# Patient Record
Sex: FEMALE | Race: OTHER | NOT HISPANIC OR LATINO
[De-identification: names, ages, dates, MRNs, and addresses within clinical notes are randomized per-mention and may not be internally consistent; named-entity substitution may affect disease eponyms.]

---

## 2020-07-31 PROBLEM — Z00.00 ENCOUNTER FOR PREVENTIVE HEALTH EXAMINATION: Status: ACTIVE | Noted: 2020-07-31

## 2020-08-04 ENCOUNTER — APPOINTMENT (OUTPATIENT)
Dept: SURGERY | Facility: CLINIC | Age: 45
End: 2020-08-04
Payer: COMMERCIAL

## 2020-08-04 VITALS
HEART RATE: 79 BPM | HEIGHT: 65 IN | BODY MASS INDEX: 18.33 KG/M2 | DIASTOLIC BLOOD PRESSURE: 70 MMHG | WEIGHT: 110 LBS | OXYGEN SATURATION: 100 % | SYSTOLIC BLOOD PRESSURE: 105 MMHG | TEMPERATURE: 97.4 F

## 2020-08-04 PROCEDURE — 99203 OFFICE O/P NEW LOW 30 MIN: CPT

## 2020-08-04 NOTE — HISTORY OF PRESENT ILLNESS
[de-identified] : 46 y/o F with history of anemia, who presents for evaluation of epigastric hernia. Denies surgical history.  \par

## 2020-08-04 NOTE — PHYSICAL EXAM
[Normal] : affect appropriate [de-identified] : epigastric hernia extending to the right  tender to palpation

## 2020-08-04 NOTE — ADDENDUM
[FreeTextEntry1] : I, Martha Mcdaniel, acted solely as a scribe for Dr. Michele Tristan on 08/04/2020.

## 2020-08-04 NOTE — END OF VISIT
[FreeTextEntry3] : All medical record entries made by the Scribe were at my, Dr. Michele Tristan, direction and personally dictated by me on 08/04/2020. I have reviewed the chart and agree that the record accurately reflects my personal performance of the history, physical exam, assessment and plan. I have also personally directed, reviewed, and agreed with the chart.

## 2022-06-13 ENCOUNTER — APPOINTMENT (OUTPATIENT)
Dept: SURGERY | Facility: CLINIC | Age: 47
End: 2022-06-13
Payer: COMMERCIAL

## 2022-06-13 VITALS
HEART RATE: 68 BPM | WEIGHT: 103 LBS | BODY MASS INDEX: 17.16 KG/M2 | TEMPERATURE: 97.1 F | OXYGEN SATURATION: 99 % | SYSTOLIC BLOOD PRESSURE: 114 MMHG | HEIGHT: 65 IN | DIASTOLIC BLOOD PRESSURE: 83 MMHG

## 2022-06-13 PROCEDURE — 99243 OFF/OP CNSLTJ NEW/EST LOW 30: CPT

## 2022-06-13 RX ORDER — IBUPROFEN 600 MG/1
600 TABLET, FILM COATED ORAL
Qty: 8 | Refills: 0 | Status: ACTIVE | COMMUNITY
Start: 2021-12-13

## 2022-06-13 RX ORDER — FAMOTIDINE 20 MG/1
20 TABLET, FILM COATED ORAL
Qty: 20 | Refills: 0 | Status: ACTIVE | COMMUNITY
Start: 2021-12-13

## 2022-06-13 RX ORDER — DIAZEPAM 5 MG/1
5 TABLET ORAL
Qty: 2 | Refills: 0 | Status: ACTIVE | COMMUNITY
Start: 2022-02-17

## 2022-06-13 RX ORDER — IRON/IRON ASP GLY/FA/MV-MIN 38 125-25-1MG
TABLET ORAL
Refills: 0 | Status: ACTIVE | COMMUNITY

## 2022-06-13 RX ORDER — LORAZEPAM 0.5 MG/1
0.5 TABLET ORAL
Qty: 30 | Refills: 0 | Status: ACTIVE | COMMUNITY
Start: 2022-01-24

## 2022-06-13 NOTE — HISTORY OF PRESENT ILLNESS
[de-identified] : Ms. Estevez was previously seen by Dr. Tristan in 2020. [de-identified] : She presented today for evaluation and management of a periumbilical hernia.  She stated the hernia has been present for at least 2 years.  She first noticed the hernia as a bulge.  She noted pain of the area, especially with bending or having a bowel movement.  She stated the pain radiates across the abdomen from the right of the umbilicus toward the left abdomen.  She stated the symptoms are worsening since her prior visit with Dr. Tristan.

## 2022-06-13 NOTE — CONSULT LETTER
[FreeTextEntry1] : 2022\par \par \par \par Rayshawn García M.D.\par [facility]\par [address]\[place]  [address]\par Telephone #: [phone]\par \par \par Re: Junie Estevez\par : 1975\par \par \par Dear Dr. García:\par \par I had the opportunity to see Ms. Estevez today for evaluation and management of a periumbilical hernia.  She stated the hernia has been present for at least 2 years.  She first noticed the hernia as a bulge.  She noted pain of the area, especially with bending or having a bowel movement.  She stated the pain radiates across the abdomen from the right of the umbilicus toward the left abdomen.  She stated the symptoms are worsening since her prior visit with Dr. Tristan.\par \par On physical examination, her height is 5 feet 5 inches, her weight is 103 pounds, and BMI 17.14. Her temperature is 97.1 °F, blood pressure is 114/83, heart rate is 68, and O2 saturation is 99% on room air.  In general, she is a well-dressed, well-nourished woman who appears her stated age and is in no acute distress.  She is calm, alert and oriented x 3.  HEENT exam demonstrates no scleral icterus and a normocephalic atraumatic appearance.  Her abdomen has audible bowel sounds, is soft, non-tender, and non-distended.  There is a small umbilical hernia that is reducible and non-tender.  There is a small rectus diastasis above the umbilicus.  Her extremities are warm and dry without clubbing, cyanosis or edema.  \par \par In summary, Ms. Estevez is a 47-year-old woman with a ventral hernia.  We will plan for an open ventral hernia repair with mesh on 2022.\par \par Thank you for the opportunity to care for this patient.  Please do not hesitate to contact me in the event that you have any questions or concerns regarding the care of this patient. \par \par Sincerely,\par \par \par \par \par Sayra Spann M.D.

## 2022-06-13 NOTE — PHYSICAL EXAM
[Calm] : calm [de-identified] : NAD, comfortable [de-identified] : NCAT, no scleral icterus [de-identified] : +BS soft NT ND.  No hepatosplenomegaly.  Small umbilical hernia, reducible and non-tender.  Small rectus diastasis above the umbilicus. [de-identified] : No clubbing, cyanosis, or edema. [de-identified] : Warm, dry. [de-identified] : A&Ox3

## 2022-06-13 NOTE — ASSESSMENT
[FreeTextEntry1] : Ms. Estevez is a 47-year-old woman with a ventral hernia.  We will plan for an open ventral hernia repair with mesh on July 20, 2022.

## 2022-07-18 ENCOUNTER — LABORATORY RESULT (OUTPATIENT)
Age: 47
End: 2022-07-18

## 2022-07-20 ENCOUNTER — TRANSCRIPTION ENCOUNTER (OUTPATIENT)
Age: 47
End: 2022-07-20

## 2022-07-20 NOTE — ASU PATIENT PROFILE, ADULT - NS PREOP UNDERSTANDS INFO
As per MD nothing to eat or drink after midnight. Patient instructed to come with photo ID, vaccination and insurance card; must dress comfortable; no jewelries, no smoking, alcohol drinking, or illicit drug use tonight; escort must show proof of vaccination and photo ID. Address and call back number provided./yes

## 2022-07-20 NOTE — ASU PATIENT PROFILE, ADULT - FALL HARM RISK - UNIVERSAL INTERVENTIONS
Bed in lowest position, wheels locked, appropriate side rails in place/Call bell, personal items and telephone in reach/Instruct patient to call for assistance before getting out of bed or chair/Non-slip footwear when patient is out of bed/[place] to call system/Physically safe environment - no spills, clutter or unnecessary equipment/Purposeful Proactive Rounding/Room/bathroom lighting operational, light cord in reach

## 2022-07-20 NOTE — ASU PATIENT PROFILE, ADULT - NSICDXPASTMEDICALHX_GEN_ALL_CORE_FT
PAST MEDICAL HISTORY:  Acid reflux     Migraines     Palpitations     S/P hysterectomy      PAST MEDICAL HISTORY:  Acid reflux     Migraines     Palpitations

## 2022-07-21 ENCOUNTER — TRANSCRIPTION ENCOUNTER (OUTPATIENT)
Age: 47
End: 2022-07-21

## 2022-07-21 ENCOUNTER — APPOINTMENT (OUTPATIENT)
Dept: SURGERY | Facility: AMBULATORY SURGERY CENTER | Age: 47
End: 2022-07-21

## 2022-07-21 ENCOUNTER — OUTPATIENT (OUTPATIENT)
Dept: OUTPATIENT SERVICES | Facility: HOSPITAL | Age: 47
LOS: 1 days | Discharge: HOME | End: 2022-07-21

## 2022-07-21 VITALS
HEIGHT: 65 IN | HEART RATE: 69 BPM | TEMPERATURE: 98 F | OXYGEN SATURATION: 99 % | RESPIRATION RATE: 16 BRPM | WEIGHT: 102.96 LBS

## 2022-07-21 VITALS — SYSTOLIC BLOOD PRESSURE: 117 MMHG | HEART RATE: 84 BPM | OXYGEN SATURATION: 97 % | DIASTOLIC BLOOD PRESSURE: 79 MMHG

## 2022-07-21 DIAGNOSIS — Z90.710 ACQUIRED ABSENCE OF BOTH CERVIX AND UTERUS: Chronic | ICD-10-CM

## 2022-07-21 PROCEDURE — 49560: CPT

## 2022-07-21 RX ORDER — APREPITANT 80 MG/1
40 CAPSULE ORAL ONCE
Refills: 0 | Status: COMPLETED | OUTPATIENT
Start: 2022-07-21 | End: 2022-07-21

## 2022-07-21 RX ORDER — ONDANSETRON 8 MG/1
4 TABLET, FILM COATED ORAL ONCE
Refills: 0 | Status: DISCONTINUED | OUTPATIENT
Start: 2022-07-21 | End: 2022-07-21

## 2022-07-21 RX ORDER — FENTANYL CITRATE 50 UG/ML
25 INJECTION INTRAVENOUS
Refills: 0 | Status: DISCONTINUED | OUTPATIENT
Start: 2022-07-21 | End: 2022-07-21

## 2022-07-21 RX ORDER — OXYCODONE HYDROCHLORIDE 5 MG/1
1 TABLET ORAL
Qty: 3 | Refills: 0
Start: 2022-07-21

## 2022-07-21 RX ORDER — DOCUSATE SODIUM 100 MG
1 CAPSULE ORAL
Qty: 30 | Refills: 0
Start: 2022-07-21

## 2022-07-21 RX ORDER — BENZOYL PEROXIDE MICRONIZED 5.8 %
1 TOWELETTE (EA) TOPICAL
Qty: 0 | Refills: 0 | DISCHARGE

## 2022-07-21 RX ORDER — SODIUM CHLORIDE 9 MG/ML
1000 INJECTION, SOLUTION INTRAVENOUS
Refills: 0 | Status: DISCONTINUED | OUTPATIENT
Start: 2022-07-21 | End: 2022-07-21

## 2022-07-21 RX ORDER — ACETAMINOPHEN 500 MG
1000 TABLET ORAL ONCE
Refills: 0 | Status: COMPLETED | OUTPATIENT
Start: 2022-07-21 | End: 2022-07-21

## 2022-07-21 RX ADMIN — APREPITANT 40 MILLIGRAM(S): 80 CAPSULE ORAL at 09:01

## 2022-07-21 RX ADMIN — SODIUM CHLORIDE 100 MILLILITER(S): 9 INJECTION, SOLUTION INTRAVENOUS at 12:35

## 2022-07-21 RX ADMIN — Medication 1000 MILLIGRAM(S): at 09:01

## 2022-07-21 NOTE — BRIEF OPERATIVE NOTE - OPERATION/FINDINGS
Midline incision 2 cm above umbilicus taken down to fascia where a ~ 0.4 cm defect was seen with no herniated contents. The fascia was repair primarily in a simple interrupted fashion. Skin closure was uneventful.

## 2022-07-21 NOTE — ASU DISCHARGE PLAN (ADULT/PEDIATRIC) - NS MD DC FALL RISK RISK
For information on Fall & Injury Prevention, visit: [email or web address] OR  [email or web address] OR  https://www.cdc.gov/steadi/patient.html

## 2022-07-21 NOTE — ASU DISCHARGE PLAN (ADULT/PEDIATRIC) - CARE PROVIDER_API CALL
Sayra Spann)  Surgery  [address]  [place], [place] [address]  Phone: [phone]  Fax: [phone]  Follow Up Time:

## 2022-07-21 NOTE — ASU DISCHARGE PLAN (ADULT/PEDIATRIC) - ASU DC SPECIAL INSTRUCTIONSFT
- You had the following surgery with Dr. Spann: ventral hernia repair.  -  Take acetaminophen (325 mg x 2 tablets) and ibuprofen (200 mg x 1 tablet) every 4 hours for pain on alternating basis such that you take acetaminophen then ibuprofen 2 hours later then acetaminophen 2 hours later. The medications are effective pain control agents that act differently and so do not increase the adverse effects of the other. Please do not take more than 4000 mg of acetaminophen daily as it can be hazardous to the liver in extreme doses. Take ibuprofen as recommended as in large doses over time it can cause peptic ulcers and other problems.   - Take oxycodone (5 mg tablet) only as needed for severe pain. This is a strong opiate pain medication and can be addicting, may make you drowsy, and will slow your bowels. Please take docusate capsules (every 8 hours) while taking oxycodone for stool softening effect.   - Please avoid submerging the incisions which are covered with skin glue (Dermabond). You may shower but do not scrub at the incisions.   - You may remove the dressing in 2 days, and you do not need to replace it unless there is drainage.   - Please call your surgeon's office for high fevers, worsening pain, redness/swelling or thick discharge from the incisions.   - Please avoid heavy lifting until evaluated by your surgeon.   - Follow-up with the surgeon in 1-2 weeks.

## 2022-07-22 ENCOUNTER — NON-APPOINTMENT (OUTPATIENT)
Age: 47
End: 2022-07-22

## 2022-07-22 PROBLEM — R00.2 PALPITATIONS: Chronic | Status: ACTIVE | Noted: 2022-07-20

## 2022-07-22 PROBLEM — K21.9 GASTRO-ESOPHAGEAL REFLUX DISEASE WITHOUT ESOPHAGITIS: Chronic | Status: ACTIVE | Noted: 2022-07-20

## 2022-07-22 PROBLEM — G43.909 MIGRAINE, UNSPECIFIED, NOT INTRACTABLE, WITHOUT STATUS MIGRAINOSUS: Chronic | Status: ACTIVE | Noted: 2022-07-20

## 2022-08-01 ENCOUNTER — APPOINTMENT (OUTPATIENT)
Dept: SURGERY | Facility: CLINIC | Age: 47
End: 2022-08-01

## 2022-08-01 VITALS
BODY MASS INDEX: 17.02 KG/M2 | TEMPERATURE: 97.7 F | HEART RATE: 69 BPM | WEIGHT: 102.13 LBS | OXYGEN SATURATION: 100 % | DIASTOLIC BLOOD PRESSURE: 78 MMHG | SYSTOLIC BLOOD PRESSURE: 114 MMHG | HEIGHT: 65 IN

## 2022-08-01 DIAGNOSIS — F41.9 ANXIETY DISORDER, UNSPECIFIED: ICD-10-CM

## 2022-08-01 DIAGNOSIS — Z86.018 PERSONAL HISTORY OF OTHER BENIGN NEOPLASM: ICD-10-CM

## 2022-08-01 DIAGNOSIS — D64.9 ANEMIA, UNSPECIFIED: ICD-10-CM

## 2022-08-01 DIAGNOSIS — K43.9 VENTRAL HERNIA W/OUT OBSTRUCTION OR GANGRENE: ICD-10-CM

## 2022-08-01 DIAGNOSIS — Z86.69 PERSONAL HISTORY OF OTHER DISEASES OF THE NERVOUS SYSTEM AND SENSE ORGANS: ICD-10-CM

## 2022-08-01 DIAGNOSIS — Z78.9 OTHER SPECIFIED HEALTH STATUS: ICD-10-CM

## 2022-08-01 DIAGNOSIS — K21.9 GASTRO-ESOPHAGEAL REFLUX DISEASE W/OUT ESOPHAGITIS: ICD-10-CM

## 2022-08-01 DIAGNOSIS — M62.08 SEPARATION OF MUSCLE (NONTRAUMATIC), OTHER SITE: ICD-10-CM

## 2022-08-01 PROCEDURE — 99024 POSTOP FOLLOW-UP VISIT: CPT

## 2022-08-01 NOTE — ASSESSMENT
[FreeTextEntry1] : Ms. Estevez is a 47-year-old woman who underwent an open ventral hernia repair on July 21, 2022.  She is recovering as expected and will follow up with me as needed.

## 2022-08-01 NOTE — HISTORY OF PRESENT ILLNESS
[de-identified] : Ms. Estevez was previously seen by Dr. Tristan in 2020.  She presented previously for evaluation and management of a periumbilical hernia.  She stated the hernia has been present for at least 2 years.  She first noticed the hernia as a bulge.  She noted pain of the area, especially with bending or having a bowel movement.  She stated the pain radiates across the abdomen from the right of the umbilicus toward the left abdomen.  She stated the symptoms are worsening since her prior visit with Dr. Tristan.  She underwent an open ventral hernia repair on July 21, 2022. [de-identified] : She presented today for a routine post-operative visit.  She is overall feeling well.  She denied fever, chills, vomiting, diarrhea, or constipation.  She noted one episode of nausea today after eating, and stated she had some pain in her abdomen yesterday.

## 2022-08-01 NOTE — REASON FOR VISIT
[Post Op: _________] : a [unfilled] post op visit [FreeTextEntry1] : She underwent an open ventral hernia repair on July 21, 2022.

## 2022-08-01 NOTE — PHYSICAL EXAM
[Calm] : calm [de-identified] : NAD, comfortable [de-identified] : NCAT, no scleral icterus [de-identified] : soft NT ND.  Incision healing well without erythema or induration.  No evidence of a recurrent ventral hernia on Valsalva maneuver. [de-identified] : No clubbing, cyanosis, or edema. [de-identified] : Warm, dry. [de-identified] : A&Ox3

## 2022-08-01 NOTE — CONSULT LETTER
[FreeTextEntry1] : 2022\par \par \par \par Rayshawn García M.D.\par [facility] Office\par [address]\[place]  [address]\par Telephone #: [phone]\par \par \par Re: Junie Estevez\par : 1975\par \par \par Dear Dr. García:\par \par I had the opportunity to see Ms. Estevez today for a routine post-operative visit after she underwent an open ventral hernia repair on 2022.  She is overall feeling well.  She denied fever, chills, vomiting, diarrhea, or constipation.  She noted one episode of nausea today after eating, and stated she had some pain in her abdomen yesterday.\par \par On physical examination, her height is 5 feet 5 inches, her weight is 102 pounds, and BMI 16.99. Her temperature is 97.7 °F, blood pressure is 114/78, heart rate is 69, and O2 saturation is 100% on room air.  In general, she is a well-dressed, well-nourished woman who appears her stated age and is in no acute distress.  She is calm, alert and oriented x 3.  HEENT exam demonstrates no scleral icterus and a normocephalic atraumatic appearance.  Her abdomen is soft, non-tender, and non-distended.  The incision is healing well without erythema or induration.  There is no evidence of a recurrent supraumbilical hernia with Valsalva maneuver.  Her extremities are warm and dry without clubbing, cyanosis or edema.  \par \par In summary, Ms. Estevez is a 47-year-old woman who underwent an open ventral hernia repair on 2022.  She is recovering as expected and will follow up with me as needed.\par \par Thank you for the opportunity to care for this patient.  Please do not hesitate to contact me in the event that you have any questions or concerns regarding the care of this patient. \par \par Sincerely,\par \par \par \par \par Sayra Spann M.D.

## 2022-12-29 NOTE — PACU DISCHARGE NOTE - AIRWAY PATENCY:
Pt notified, requesting [facility] group. Referral placed, they will contact Pt to schedule. Satisfactory

## 2023-01-30 ENCOUNTER — APPOINTMENT (OUTPATIENT)
Dept: GASTROENTEROLOGY | Facility: CLINIC | Age: 48
End: 2023-01-30
Payer: COMMERCIAL

## 2023-01-30 VITALS
RESPIRATION RATE: 16 BRPM | BODY MASS INDEX: 17.49 KG/M2 | TEMPERATURE: 97.7 F | WEIGHT: 105 LBS | DIASTOLIC BLOOD PRESSURE: 74 MMHG | SYSTOLIC BLOOD PRESSURE: 118 MMHG | HEART RATE: 78 BPM | HEIGHT: 65 IN | OXYGEN SATURATION: 98 %

## 2023-01-30 DIAGNOSIS — R10.12 LEFT UPPER QUADRANT PAIN: ICD-10-CM

## 2023-01-30 DIAGNOSIS — R10.9 UNSPECIFIED ABDOMINAL PAIN: ICD-10-CM

## 2023-01-30 DIAGNOSIS — R30.0 DYSURIA: ICD-10-CM

## 2023-01-30 PROCEDURE — 99204 OFFICE O/P NEW MOD 45 MIN: CPT

## 2023-01-30 NOTE — ASSESSMENT
[FreeTextEntry1] : 47F w/ PMHx of GERD here for abd pain.\par \par #Abdominal Pain\par Patient with hx of abd pain, previously controlled with PPI. Symptoms now return with nausea, regurgitation, heartburn and dysphagia. Given alarm sx, will plan for endoscopy.\par -Start Omeprazole 40 mg daily\par -Obtain CT abd/pelvis\par -R/B/A of EGD discussed, patient verbalized understanding and agreement to plan\par -EGD at [facility] if CT is unremarkable\par -COVID testing per protocol\par -Chaperone on day of procedure\par -Obtain CBC, CMP, and lipase\par -Obtain H pylori breath test\par \par #Dysuria and Flank Pain\par -Followup with PMD\par -Obtain urinalysis\par -Discussed warning signs to seek immediate medical attention including fever, worsening or persistent pain, rigors/malaise, etc. \par \par RTC postprocedure\par \par

## 2023-01-30 NOTE — HISTORY OF PRESENT ILLNESS
[FreeTextEntry1] : 47F w/ PMHx of GERD here for abd pain.\par \par Patient reports long standing abd pain, worse with eating. Previously treated with PPI with improvement in sx. Patient reports return after discontinuation so she decided to come. Symptoms are associated with heartburn, nausea, regurgitation, dysphagia, and abd pain. She noted around one week ago, she started having burning urination and left flank pain. No prior similar episodes and denies UTI and sexual activity.\par \par She reports being at ED in [place] for similar pain in December. However, MRI was unremarkable per patient.\par \par Denies fever, chill, cp, sob, diarrhea, constipation, bloating, belching, wt loss or loss of appetite, melena or hematochezia..\par \par ALL: NKDA\par Med: Denies use of NSAIDs or AC\par Surg hx: Hernia repair and hysterectomy\par SocHx: Denies smoking, alcohol or drug use.\par Fam hx: Denies fam hx of CRC, large polyp and esophageal/gastric ca\par \par EGD: No prior\par Colonoscopy: 2020, normal per patient. She reports repeat in 10 yr.\par

## 2023-01-30 NOTE — PHYSICAL EXAM
[Alert] : alert [No Acute Distress] : no acute distress [Sclera] : the sclera and conjunctiva were normal [Oropharynx] : the oropharynx was normal [Normal Appearance] : the appearance of the neck was normal [No Neck Mass] : no neck mass was observed [No Respiratory Distress] : no respiratory distress [No Acc Muscle Use] : no accessory muscle use [Abdomen Soft] : soft [Oriented To Time, Place, And Person] : oriented to person, place, and time [Normal Mood] : the mood was normal [de-identified] : Tender to palpation at the LLQ [de-identified] : Left CVA tenderness

## 2023-01-31 LAB
ALBUMIN SERPL ELPH-MCNC: 4.3 G/DL
ALP BLD-CCNC: 66 U/L
ALT SERPL-CCNC: 12 U/L
ANION GAP SERPL CALC-SCNC: 9 MMOL/L
APPEARANCE: CLEAR
AST SERPL-CCNC: 11 U/L
BASOPHILS # BLD AUTO: 0.02 K/UL
BASOPHILS NFR BLD AUTO: 0.5 %
BILIRUB SERPL-MCNC: 0.9 MG/DL
BILIRUBIN URINE: NEGATIVE
BLOOD URINE: NEGATIVE
BUN SERPL-MCNC: 8 MG/DL
CALCIUM SERPL-MCNC: 10 MG/DL
CHLORIDE SERPL-SCNC: 103 MMOL/L
CO2 SERPL-SCNC: 26 MMOL/L
COLOR: NORMAL
CREAT SERPL-MCNC: 0.88 MG/DL
EGFR: 81 ML/MIN/1.73M2
EOSINOPHIL # BLD AUTO: 0.13 K/UL
EOSINOPHIL NFR BLD AUTO: 3.1 %
GLUCOSE QUALITATIVE U: NEGATIVE
GLUCOSE SERPL-MCNC: 83 MG/DL
HCT VFR BLD CALC: 36.2 %
HGB BLD-MCNC: 12.1 G/DL
IMM GRANULOCYTES NFR BLD AUTO: 0.2 %
KETONES URINE: NORMAL
LEUKOCYTE ESTERASE URINE: NEGATIVE
LPL SERPL-CCNC: 17 U/L
LYMPHOCYTES # BLD AUTO: 1.51 K/UL
LYMPHOCYTES NFR BLD AUTO: 36 %
MAN DIFF?: NORMAL
MCHC RBC-ENTMCNC: 27.2 PG
MCHC RBC-ENTMCNC: 33.4 GM/DL
MCV RBC AUTO: 81.3 FL
MONOCYTES # BLD AUTO: 0.36 K/UL
MONOCYTES NFR BLD AUTO: 8.6 %
NEUTROPHILS # BLD AUTO: 2.17 K/UL
NEUTROPHILS NFR BLD AUTO: 51.6 %
NITRITE URINE: NEGATIVE
PH URINE: 6
PLATELET # BLD AUTO: 190 K/UL
POTASSIUM SERPL-SCNC: 4 MMOL/L
PROT SERPL-MCNC: 7.3 G/DL
PROTEIN URINE: NEGATIVE
RBC # BLD: 4.45 M/UL
RBC # FLD: 12.6 %
SODIUM SERPL-SCNC: 139 MMOL/L
SPECIFIC GRAVITY URINE: 1.01
UROBILINOGEN URINE: NORMAL
WBC # FLD AUTO: 4.2 K/UL

## 2023-01-31 RX ORDER — OMEPRAZOLE 40 MG/1
40 CAPSULE, DELAYED RELEASE ORAL
Qty: 60 | Refills: 0 | Status: ACTIVE | COMMUNITY
Start: 2023-01-30 | End: 1900-01-01

## 2023-02-02 LAB — UREA BREATH TEST QL: NEGATIVE

## 2024-01-16 ENCOUNTER — EMERGENCY (EMERGENCY)
Facility: HOSPITAL | Age: 49
LOS: 1 days | Discharge: HOME | End: 2024-01-16
Attending: EMERGENCY MEDICINE | Admitting: EMERGENCY MEDICINE
Payer: COMMERCIAL

## 2024-01-16 VITALS
WEIGHT: 108.03 LBS | TEMPERATURE: 98 F | DIASTOLIC BLOOD PRESSURE: 84 MMHG | HEIGHT: 65 IN | SYSTOLIC BLOOD PRESSURE: 148 MMHG | RESPIRATION RATE: 18 BRPM | OXYGEN SATURATION: 98 % | HEART RATE: 81 BPM

## 2024-01-16 VITALS
OXYGEN SATURATION: 100 % | DIASTOLIC BLOOD PRESSURE: 72 MMHG | HEART RATE: 69 BPM | SYSTOLIC BLOOD PRESSURE: 114 MMHG | TEMPERATURE: 98 F | RESPIRATION RATE: 18 BRPM

## 2024-01-16 DIAGNOSIS — R07.9 CHEST PAIN, UNSPECIFIED: ICD-10-CM

## 2024-01-16 DIAGNOSIS — R42 DIZZINESS AND GIDDINESS: ICD-10-CM

## 2024-01-16 DIAGNOSIS — F32.A DEPRESSION, UNSPECIFIED: ICD-10-CM

## 2024-01-16 DIAGNOSIS — H92.03 OTALGIA, BILATERAL: ICD-10-CM

## 2024-01-16 DIAGNOSIS — Z90.710 ACQUIRED ABSENCE OF BOTH CERVIX AND UTERUS: Chronic | ICD-10-CM

## 2024-01-16 LAB
ANION GAP SERPL CALC-SCNC: 9 MMOL/L — SIGNIFICANT CHANGE UP (ref 5–17)
BASOPHILS # BLD AUTO: 0.03 K/UL — SIGNIFICANT CHANGE UP (ref 0–0.2)
BASOPHILS NFR BLD AUTO: 0.8 % — SIGNIFICANT CHANGE UP (ref 0–2)
BUN SERPL-MCNC: 9 MG/DL — SIGNIFICANT CHANGE UP (ref 7–23)
CALCIUM SERPL-MCNC: 9.9 MG/DL — SIGNIFICANT CHANGE UP (ref 8.4–10.5)
CHLORIDE SERPL-SCNC: 106 MMOL/L — SIGNIFICANT CHANGE UP (ref 96–108)
CO2 SERPL-SCNC: 28 MMOL/L — SIGNIFICANT CHANGE UP (ref 22–31)
CREAT SERPL-MCNC: 0.87 MG/DL — SIGNIFICANT CHANGE UP (ref 0.5–1.3)
EGFR: 81 ML/MIN/1.73M2 — SIGNIFICANT CHANGE UP
EOSINOPHIL # BLD AUTO: 0.11 K/UL — SIGNIFICANT CHANGE UP (ref 0–0.5)
EOSINOPHIL NFR BLD AUTO: 2.8 % — SIGNIFICANT CHANGE UP (ref 0–6)
GLUCOSE SERPL-MCNC: 95 MG/DL — SIGNIFICANT CHANGE UP (ref 70–99)
HCT VFR BLD CALC: 34.6 % — SIGNIFICANT CHANGE UP (ref 34.5–45)
HGB BLD-MCNC: 11.7 G/DL — SIGNIFICANT CHANGE UP (ref 11.5–15.5)
IMM GRANULOCYTES NFR BLD AUTO: 0.3 % — SIGNIFICANT CHANGE UP (ref 0–0.9)
LYMPHOCYTES # BLD AUTO: 1.74 K/UL — SIGNIFICANT CHANGE UP (ref 1–3.3)
LYMPHOCYTES # BLD AUTO: 44.3 % — HIGH (ref 13–44)
MCHC RBC-ENTMCNC: 26.5 PG — LOW (ref 27–34)
MCHC RBC-ENTMCNC: 33.8 GM/DL — SIGNIFICANT CHANGE UP (ref 32–36)
MCV RBC AUTO: 78.5 FL — LOW (ref 80–100)
MONOCYTES # BLD AUTO: 0.26 K/UL — SIGNIFICANT CHANGE UP (ref 0–0.9)
MONOCYTES NFR BLD AUTO: 6.6 % — SIGNIFICANT CHANGE UP (ref 2–14)
NEUTROPHILS # BLD AUTO: 1.78 K/UL — LOW (ref 1.8–7.4)
NEUTROPHILS NFR BLD AUTO: 45.2 % — SIGNIFICANT CHANGE UP (ref 43–77)
NRBC # BLD: 0 /100 WBCS — SIGNIFICANT CHANGE UP (ref 0–0)
NT-PROBNP SERPL-SCNC: <36 PG/ML — SIGNIFICANT CHANGE UP (ref 0–300)
PLATELET # BLD AUTO: 164 K/UL — SIGNIFICANT CHANGE UP (ref 150–400)
POTASSIUM SERPL-MCNC: 4 MMOL/L — SIGNIFICANT CHANGE UP (ref 3.5–5.3)
POTASSIUM SERPL-SCNC: 4 MMOL/L — SIGNIFICANT CHANGE UP (ref 3.5–5.3)
RBC # BLD: 4.41 M/UL — SIGNIFICANT CHANGE UP (ref 3.8–5.2)
RBC # FLD: 13.2 % — SIGNIFICANT CHANGE UP (ref 10.3–14.5)
SODIUM SERPL-SCNC: 143 MMOL/L — SIGNIFICANT CHANGE UP (ref 135–145)
TROPONIN T, HIGH SENSITIVITY RESULT: <6 NG/L — SIGNIFICANT CHANGE UP (ref 0–51)
WBC # BLD: 3.93 K/UL — SIGNIFICANT CHANGE UP (ref 3.8–10.5)
WBC # FLD AUTO: 3.93 K/UL — SIGNIFICANT CHANGE UP (ref 3.8–10.5)

## 2024-01-16 PROCEDURE — 85025 COMPLETE CBC W/AUTO DIFF WBC: CPT

## 2024-01-16 PROCEDURE — 71045 X-RAY EXAM CHEST 1 VIEW: CPT

## 2024-01-16 PROCEDURE — 80048 BASIC METABOLIC PNL TOTAL CA: CPT

## 2024-01-16 PROCEDURE — 93005 ELECTROCARDIOGRAM TRACING: CPT

## 2024-01-16 PROCEDURE — 84484 ASSAY OF TROPONIN QUANT: CPT

## 2024-01-16 PROCEDURE — 83880 ASSAY OF NATRIURETIC PEPTIDE: CPT

## 2024-01-16 PROCEDURE — 99285 EMERGENCY DEPT VISIT HI MDM: CPT | Mod: 25

## 2024-01-16 PROCEDURE — 36415 COLL VENOUS BLD VENIPUNCTURE: CPT

## 2024-01-16 PROCEDURE — 99285 EMERGENCY DEPT VISIT HI MDM: CPT

## 2024-01-16 PROCEDURE — 71045 X-RAY EXAM CHEST 1 VIEW: CPT | Mod: 26

## 2024-01-16 RX ORDER — ACETAMINOPHEN 500 MG
650 TABLET ORAL ONCE
Refills: 0 | Status: COMPLETED | OUTPATIENT
Start: 2024-01-16 | End: 2024-01-16

## 2024-01-16 RX ORDER — SODIUM CHLORIDE 9 MG/ML
500 INJECTION INTRAMUSCULAR; INTRAVENOUS; SUBCUTANEOUS ONCE
Refills: 0 | Status: COMPLETED | OUTPATIENT
Start: 2024-01-16 | End: 2024-01-16

## 2024-01-16 RX ADMIN — Medication 650 MILLIGRAM(S): at 17:04

## 2024-01-16 RX ADMIN — SODIUM CHLORIDE 500 MILLILITER(S): 9 INJECTION INTRAMUSCULAR; INTRAVENOUS; SUBCUTANEOUS at 17:01

## 2024-01-16 NOTE — ED ADULT NURSE NOTE - NSFALLUNIVINTERV_ED_ALL_ED
Bed/Stretcher in lowest position, wheels locked, appropriate side rails in place/Call bell, personal items and telephone in reach/Instruct patient to call for assistance before getting out of bed/chair/stretcher/Non-slip footwear applied when patient is off stretcher/[place] to call system/Physically safe environment - no spills, clutter or unnecessary equipment/Purposeful proactive rounding/Room/bathroom lighting operational, light cord in reach Bed/Stretcher in lowest position, wheels locked, appropriate side rails in place/Call bell, personal items and telephone in reach/Instruct patient to call for assistance before getting out of bed/chair/stretcher/Non-slip footwear applied when patient is off stretcher/[place] to call system/Physically safe environment - no spills, clutter or unnecessary equipment/Purposeful proactive rounding/Room/bathroom lighting operational, light cord in reach

## 2024-01-16 NOTE — ED ADULT TRIAGE NOTE - TEMPERATURE IN CELSIUS (DEGREES C)
Problem: Pain  Goal: Patient's pain/discomfort is manageable  Outcome: Progressing     Problem: Safety  Goal: Patient will be injury free during hospitalization  Outcome: Progressing     Problem: Daily Care  Goal: Daily care needs are met  Outcome: Progressing     Problem: Psychosocial Needs  Goal: Demonstrates ability to cope with hospitalization/illness  Outcome: Not Progressing  Goal: Collaborate with patient/family/caregiver to identify patient specific goals for this hospitalization  Outcome: Progressing     Problem: Discharge Barriers  Goal: Patient's discharge needs are met  Outcome: Not Applicable this Shift      36.9

## 2024-01-16 NOTE — ED ADULT NURSE NOTE - CHIEF COMPLAINT QUOTE
"I have had left sided chest pain for a few days. I am also feeling lightheaded for the past 2 days." Denies shortness of breath, fevers/chills, nausea/vomiting.

## 2024-01-16 NOTE — ED PROVIDER NOTE - NSFOLLOWUPINSTRUCTIONS_ED_ALL_ED_FT
1. You were seen for chest pain. A copy of any of your resulted labs, imaging, and findings have been provided to you. Make sure to view any test results that may not have yet resulted at the time of your discharge by creating a FollowMyHealth account at: [email or web address] to sign up for FollowMyHealth.   2. Continue to take your home medications as prescribed.   3. Please follow up with your primary care physician. You may call our referrals coordinator at [phone] Monday to Friday 11am-7pm for assistance with making an appointment. Or you can call [phone] to make an appointment.  4. Return to the emergency department for new, persistent, or worsening symptoms or signs, or for any concerning symptoms.   5. For your for health, you should make healthy food choices and be physically active. Also, you should not smoke or use drugs, and you should not drink alcohol in excess. Please visit [facility]/[email or web address] for resources and more information.    Chest Pain    Chest pain can be caused by many different conditions which may or may not be dangerous. Causes include heartburn, lung infections, heart attack, blood clot in lungs, skin infections, strain or damage to muscle, cartilage, or bones, etc. In addition to a history and physical examination, an electrocardiogram (ECG) or other lab tests may have been performed to determine the cause of your chest pain. Follow up with your primary care provider or with a cardiologist as instructed.     SEEK IMMEDIATE MEDICAL CARE IF YOU HAVE ANY OF THE FOLLOWING SYMPTOMS: worsening chest pain, coughing up blood, unexplained back/neck/jaw pain, severe abdominal pain, dizziness or lightheadedness, fainting, shortness of breath, sweaty or clammy skin, vomiting, or racing heart beat. These symptoms may represent a serious problem that is an emergency. Do not wait to see if the symptoms will go away. Get medical help right away. Call 911 and do not drive yourself to the hospital. 1. You were seen for chest pain. A copy of any of your resulted labs, imaging, and findings have been provided to you. Make sure to view any test results that may not have yet resulted at the time of your discharge by creating a FollowMyHealth account at: [email or web address] to sign up for FollowMyHealth.   2. Continue to take your home medications as prescribed.   3. Please follow up with your primary care physician. You may call our referrals coordinator at [phone] Monday to Friday 11am-7pm for assistance with making an appointment. Or you can call [phone] to make an appointment.  4. Return to the emergency department for new, persistent, or worsening symptoms or signs, or for any concerning symptoms.   5. For your for health, you should make healthy food choices and be physically active. Also, you should not smoke or use drugs, and you should not drink alcohol in excess. Please visit [email or web address]/[email or web address] for resources and more information.    Chest Pain    Chest pain can be caused by many different conditions which may or may not be dangerous. Causes include heartburn, lung infections, heart attack, blood clot in lungs, skin infections, strain or damage to muscle, cartilage, or bones, etc. In addition to a history and physical examination, an electrocardiogram (ECG) or other lab tests may have been performed to determine the cause of your chest pain. Follow up with your primary care provider or with a cardiologist as instructed.     SEEK IMMEDIATE MEDICAL CARE IF YOU HAVE ANY OF THE FOLLOWING SYMPTOMS: worsening chest pain, coughing up blood, unexplained back/neck/jaw pain, severe abdominal pain, dizziness or lightheadedness, fainting, shortness of breath, sweaty or clammy skin, vomiting, or racing heart beat. These symptoms may represent a serious problem that is an emergency. Do not wait to see if the symptoms will go away. Get medical help right away. Call 911 and do not drive yourself to the hospital.

## 2024-01-16 NOTE — ED ADULT NURSE NOTE - OBJECTIVE STATEMENT
48y F presents to ED c/o midsternal CP x1 week. Endorses intermittent nonradiating midsternal CP, lightheadedness x1 week. Pt also c/o BL ear pain x3days, room spinning dizziness 0930 this AM ~ 2 minutes. Denies syncope, numbness/tingling, N/V, abdominal/back pain, weakness, fever/chills, cough/congestion/runny nose. Pt AAOx4, speaking in full and clear sentences, NAD at this time. Ambulatory with steady gait. Continuous cardiac/pulse oximetry monitoring initiated. MD Myers at bedside. 48y F presents to ED c/o midsternal CP x1 week. Endorses intermittent nonradiating midsternal CP, lightheadedness x1 week. Pt also c/o BL ear pain x3days, room spinning dizziness 0930 this AM ~ 2 minutes. Denies syncope, numbness/tingling, N/V, abdominal/back pain, weakness, fever/chills, cough/congestion/runny nose. no current dizziness/blurry vision/CP. Pt AAOx4, speaking in full and clear sentences, NAD at this time. Ambulatory with steady gait. Continuous cardiac/pulse oximetry monitoring initiated. MD Myers at bedside.

## 2024-01-16 NOTE — ED PROVIDER NOTE - OBJECTIVE STATEMENT
48F with a hx of anxiety and depression who p/w intermittent chest pain x 1 week. Pt states it got worse in the past two day and this morning was 48F with a hx of anxiety and depression who p/w intermittent chest pain x 1 week. Pt states it got worse in the past two day as rest and with exertion "feels like something is pulled" and this morning was she felt some bilateral ear pain and felt lightheaded so she came to the ER. No f/c, no sob, no n/v, no abd pain, no ha or neck pain, no dizziness or syncope, no n/t/w in ext. No trauma or fall. No hx or FHx of CAD/VTE. No PE RFs. No other complaints.

## 2024-01-16 NOTE — ED PROVIDER NOTE - CLINICAL SUMMARY MEDICAL DECISION MAKING FREE TEXT BOX
48F with a hx of anxiety and depression who p/w intermittent chest pain x 1 week. Pt states it got worse in the past two day as rest and with exertion "feels like something is pulled" and this morning was she felt some bilateral ear pain and felt lightheaded so she came to the ER. No f/c, no sob, no n/v, no abd pain, no ha or neck pain, no dizziness or syncope, no n/t/w in ext. No trauma or fall. No hx or FHx of CAD/VTE. No PE RFs. No other complaints.  VSS, pt well-appearing. EKG no stemi.   labs checked and wnl, trop 6, Do not suspect ACS, PE, dissection or other acute life-threatening pathology at this time.   Pt feels better after IVfs and tylenol, suspect msk pain vs gerd vs anxiety.   Pt feeling improved and is stable for DC. ED evaluation and management discussed with the patient in detail.  Close PMD follow up encouraged.  Strict ED return instructions discussed in detail and patient given the opportunity to ask any questions about their discharge diagnosis and instructions. Patient verbalized understanding.

## 2024-01-16 NOTE — ED PROVIDER NOTE - PATIENT PORTAL LINK FT
You can access the FollowMyHealth Patient Portal offered by [facility] by registering at the following website: [email or web address]. By joining [employer]’s FollowMyHealth portal, you will also be able to view your health information using other applications (apps) compatible with our system. You can access the FollowMyHealth Patient Portal offered by [facility] by registering at the following website: [email or web address]. By joining [employer]’s FollowMyHealth portal, you will also be able to view your health information using other applications (apps) compatible with our system.

## 2024-10-22 NOTE — ED PROVIDER NOTE - CCCP TRG CHIEF CMPLNT
10/22/2024      Allison Ortiz  [address] [facility] [address]      Dear Colleague,    Thank you for referring your patient, Allison Ortiz, to the [facility]. Please see a copy of my visit note below.    Suzi is a 46 year old who is being evaluated via a billable video visit.              Subjective  Suzi is a 46 year old, presenting for the following health issues:  Allergy Recheck and Allergy Injection    HPI   Chief complaint: Follow-up nasal polyps    History of present illness: This is a pleasant 46-year-old woman with a history of chronic rhinosinusitis and nasal polyps here today for follow-up visit.  Currently using Dupixent 300 mg every 2 weeks.  She denies any eye irritation skin rashes joint pains on this medication.  Overall she is able to breathe well through her nose.  She does have Flonase at home still.  No recurrence of the nasal polyps.            Objective          Vitals:  No vitals were obtained today due to virtual visit.    Physical Exam   GENERAL: alert and no distress  EYES: Eyes grossly normal to inspection.  No discharge or erythema, or obvious scleral/conjunctival abnormalities.  RESP: No audible wheeze, cough, or visible cyanosis.    SKIN: Visible skin clear. No significant rash, abnormal pigmentation or lesions.  NEURO: Cranial nerves grossly intact.  Mentation and speech appropriate for age.  PSYCH: Appropriate affect, tone, and pace of words    Impression report and plan:  1.  Nasal polyposis    Patient could try to space out her shots to every month.  This is successful in some nasal polyposis patients.  Otherwise I believe her prescription at every 2 weeks.  Reviewed risk of eye irritation and eosinophilic granulomatous polyangitis and cutaneous t-cell lymphoma.  Follow yearly.      Video-Visit Details    Type of service:  Video Visit   Originating Location (pt. Location): Home    Distant Location (provider location):   On-site  Platform used for Video Visit: Jessica  Signed Electronically by: Rosanne Santa MD        Again, thank you for allowing me to participate in the care of your patient.        Sincerely,        Rosanne Santa MD   chest pain

## 2025-01-11 ENCOUNTER — EMERGENCY (EMERGENCY)
Facility: HOSPITAL | Age: 50
LOS: 1 days | Discharge: HOME | End: 2025-01-11
Attending: EMERGENCY MEDICINE | Admitting: EMERGENCY MEDICINE
Payer: COMMERCIAL

## 2025-01-11 VITALS
DIASTOLIC BLOOD PRESSURE: 75 MMHG | OXYGEN SATURATION: 98 % | WEIGHT: 104.94 LBS | HEIGHT: 65 IN | TEMPERATURE: 99 F | HEART RATE: 96 BPM | SYSTOLIC BLOOD PRESSURE: 110 MMHG | RESPIRATION RATE: 18 BRPM

## 2025-01-11 DIAGNOSIS — R05.1 ACUTE COUGH: ICD-10-CM

## 2025-01-11 DIAGNOSIS — Z90.710 ACQUIRED ABSENCE OF BOTH CERVIX AND UTERUS: Chronic | ICD-10-CM

## 2025-01-11 LAB
FLUAV AG NPH QL: SIGNIFICANT CHANGE UP
FLUBV AG NPH QL: SIGNIFICANT CHANGE UP
RSV RNA NPH QL NAA+NON-PROBE: SIGNIFICANT CHANGE UP
SARS-COV-2 RNA SPEC QL NAA+PROBE: SIGNIFICANT CHANGE UP

## 2025-01-11 PROCEDURE — 99284 EMERGENCY DEPT VISIT MOD MDM: CPT

## 2025-01-11 PROCEDURE — 71046 X-RAY EXAM CHEST 2 VIEWS: CPT | Mod: 26

## 2025-01-11 RX ORDER — ALBUTEROL SULFATE 90 UG/1
2.5 INHALANT RESPIRATORY (INHALATION) ONCE
Refills: 0 | Status: COMPLETED | OUTPATIENT
Start: 2025-01-11 | End: 2025-01-11

## 2025-01-11 RX ORDER — ALBUTEROL SULFATE 90 UG/1
2 INHALANT RESPIRATORY (INHALATION)
Qty: 1 | Refills: 0
Start: 2025-01-11

## 2025-01-11 RX ORDER — AZITHROMYCIN MONOHYDRATE 200 MG/5ML
1 POWDER, FOR SUSPENSION ORAL
Qty: 6 | Refills: 0
Start: 2025-01-11 | End: 2025-01-15

## 2025-01-11 RX ORDER — DEXAMETHASONE SODIUM PHOSPHATE 4 MG/ML
10 VIAL (ML) INJECTION ONCE
Refills: 0 | Status: COMPLETED | OUTPATIENT
Start: 2025-01-11 | End: 2025-01-11

## 2025-01-11 RX ADMIN — Medication 10 MILLIGRAM(S): at 14:20

## 2025-01-11 RX ADMIN — ALBUTEROL SULFATE 2.5 MILLIGRAM(S): 90 INHALANT RESPIRATORY (INHALATION) at 14:21

## 2025-01-11 NOTE — ED PROVIDER NOTE - PHYSICAL EXAMINATION
Constitutional: awake and alert, in no acute distress  HEENT: head normocephalic and atraumatic. moist mucous membranes  Eyes: extraocular movements intact, normal conjunctiva  Neck: supple, normal ROM  Cardiovascular: regular rate   Pulmonary: no respiratory distress, lungs CTAB, frequent dry cough  Gastrointestinal: abdomen flat and nondistended  Skin: warm, dry, normal for ethnicity  Musculoskeletal: no edema, no deformity  Neurological: oriented x4, no focal neurologic deficit.   Psychiatric: calm and cooperative

## 2025-01-11 NOTE — ED PROVIDER NOTE - PATIENT PORTAL LINK FT
You can access the FollowMyHealth Patient Portal offered by [facility] by registering at the following website: [email or web address]. By joining [employer]’s FollowMyHealth portal, you will also be able to view your health information using other applications (apps) compatible with our system.

## 2025-01-11 NOTE — ED PROVIDER NOTE - NSFOLLOWUPINSTRUCTIONS_ED_ALL_ED_FT
Upper Respiratory Infection    WHAT YOU NEED TO KNOW:    An upper respiratory infection is also called a cold. It can affect your nose, throat, ears, and sinuses. Cold symptoms are usually worst for the first 3 to 5 days. Most people get better in 7 to 14 days. You may continue to cough for 2 to 3 weeks. Colds are caused by viruses and do not get better with antibiotics.    DISCHARGE INSTRUCTIONS:    Call your local emergency number (911 in the [place]) if:   •You have chest pain or trouble breathing.          Return to the emergency department if:   •You have a fever over 102ºF (39ºC).          Call your doctor if:   •You have a low fever.      •Your sore throat gets worse or you see white or yellow spots in your throat.      •Your symptoms get worse after 3 to 5 days or are not better in 14 days.      •You have a rash anywhere on your skin.      •You have large, tender lumps in your neck.      •You have thick, green, or yellow drainage from your nose.      •You cough up thick yellow, green, or bloody mucus.      •You have a bad earache.      •You have questions or concerns about your condition or care.      Medicines: You may need any of the following:   •Decongestants help reduce nasal congestion and help you breathe more easily. If you take decongestant pills, they may make you feel restless or cause problems with your sleep. Do not use decongestant sprays for more than a few days.      •Cough suppressants help reduce coughing. Ask your healthcare provider which type of cough medicine is best for you.       •NSAIDs, such as ibuprofen, help decrease swelling, pain, and fever. NSAIDs can cause stomach bleeding or kidney problems in certain people. If you take blood thinner medicine, always ask your healthcare provider if NSAIDs are safe for you. Always read the medicine label and follow directions.      •Acetaminophen decreases pain and fever. It is available without a doctor's order. Ask how much to take and how often to take it. Follow directions. Read the labels of all other medicines you are using to see if they also contain acetaminophen, or ask your doctor or pharmacist. Acetaminophen can cause liver damage if not taken correctly.      •Take your medicine as directed. Contact your healthcare provider if you think your medicine is not helping or if you have side effects. Tell your provider if you are allergic to any medicine. Keep a list of the medicines, vitamins, and herbs you take. Include the amounts, and when and why you take them. Bring the list or the pill bottles to follow-up visits. Carry your medicine list with you in case of an emergency.      Self-care:   •Rest as much as possible. Slowly start to do more each day.      •Drink more liquids as directed. Liquids will help thin and loosen mucus so you can cough it up. Liquids will also help prevent dehydration. Liquids that help prevent dehydration include water, fruit juice, and broth. Do not drink liquids that contain caffeine. Caffeine can increase your risk for dehydration. Ask your healthcare provider how much liquid to drink each day.      •Soothe a sore throat. Gargle with warm salt water. Make salt water by dissolving ¼ teaspoon salt in 1 cup warm water. You may also suck on hard candy or throat lozenges. You may use a sore throat spray.      •Use a humidifier or vaporizer. Use a cool mist humidifier or a vaporizer to increase air moisture in your home. This may make it easier for you to breathe and help decrease your cough.      •Use saline nasal drops as directed. These help relieve congestion.      •Apply petroleum-based jelly around the outside of your nostrils. This can decrease irritation from blowing your nose.      •Do not smoke. Nicotine and other chemicals in cigarettes and cigars can make your symptoms worse. They can also cause infections such as bronchitis or pneumonia. Ask your healthcare provider for information if you currently smoke and need help to quit. E-cigarettes or smokeless tobacco still contain nicotine. Talk to your healthcare provider before you use these products.      Prevent a cold:   •Wash your hands often. Use soap and water every time you wash your hands. Rub your soapy hands together, lacing your fingers. Use the fingers of one hand to scrub under the nails of the other hand. Wash for at least 20 seconds. Rinse with warm, running water for several seconds. Then dry your hands. Use hand  gel if soap and water are not available. Do not touch your eyes or mouth without washing your hands first.   Handwashing           •Cover a sneeze or cough. Use a tissue that covers your mouth and nose. Put the used tissue in the trash right away. Use the bend of your arm if a tissue is not available. Wash your hands well with soap and water or use a hand . Do not stand close to anyone who is sneezing or coughing.      •Try to stay away from others while you are sick. This is especially important during the first 2 to 3 days when the virus is more easily spread. Wait until a fever, cough, or other symptoms are gone before you return to work or other regular activities.      •Do not share items while you are sick. This includes food, drinks, eating utensils, and dishes.      Follow up with your doctor as directed: Write down your questions so you remember to ask them during your visits.

## 2025-01-11 NOTE — ED PROVIDER NOTE - CLINICAL SUMMARY MEDICAL DECISION MAKING FREE TEXT BOX
Patient with no significant past medical history presents with dry cough for the past 5 days, initially with fever and chills, which have since resolved.  On exam, patient is afebrile, vital signs are stable.  Patient is satting well on room air.  Patient has frequent dry cough.  Lungs are clear to auscultation bilaterally.  Viral swab negative for flu, COVID, RSV.  Chest x-ray clear.  Suspect bronchitis/viral URI.  Patient given albuterol and Decadron with improvement in symptoms.  Will DC with empiric azithromycin.  Stable for DC with plan for PMD follow-up.

## 2025-01-11 NOTE — ED PROVIDER NOTE - OBJECTIVE STATEMENT
49-year-old female no significant past medical history presents with cough productive of clear sputum for 5 days.  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  Patient reports subjective fever and chills when symptoms began, but these have resolved.  No shortness of breath.  Some chest wall pain when coughing.  No exertional chest pain or shortness of breath.  No leg swelling.  No hemoptysis.  No recent surgery, travel, or immobilization.  No history of DVT or PE.

## 2025-01-11 NOTE — ED ADULT TRIAGE NOTE - GLASGOW COMA SCALE: BEST VERBAL RESPONSE, MLM
Patient Instructions by Kaylie Armstrong MD at 03/06/17 04:57 PM     Author:  Kaylie Armstrong MD Service:  (none) Author Type:  Physician     Filed:  03/06/17 04:58 PM Encounter Date:  3/6/2017 Status:  Signed     :  Kaylie Armstrong MD (Physician)            Follow up in 1-2 weeks after surgery          Revision History        User Key Date/Time User Provider Type Action    > [N/A] 03/06/17 04:58 PM Kaylie Armstrong MD Physician Sign
(V5) oriented

## 2025-03-17 ENCOUNTER — EMERGENCY (EMERGENCY)
Facility: HOSPITAL | Age: 50
LOS: 1 days | Discharge: HOME | End: 2025-03-17
Attending: EMERGENCY MEDICINE | Admitting: EMERGENCY MEDICINE
Payer: COMMERCIAL

## 2025-03-17 VITALS
RESPIRATION RATE: 18 BRPM | TEMPERATURE: 98 F | SYSTOLIC BLOOD PRESSURE: 118 MMHG | HEART RATE: 57 BPM | DIASTOLIC BLOOD PRESSURE: 81 MMHG | OXYGEN SATURATION: 100 % | HEIGHT: 65 IN | WEIGHT: 108.03 LBS

## 2025-03-17 VITALS
DIASTOLIC BLOOD PRESSURE: 68 MMHG | OXYGEN SATURATION: 98 % | SYSTOLIC BLOOD PRESSURE: 111 MMHG | RESPIRATION RATE: 16 BRPM | HEART RATE: 55 BPM

## 2025-03-17 DIAGNOSIS — Z90.710 ACQUIRED ABSENCE OF BOTH CERVIX AND UTERUS: Chronic | ICD-10-CM

## 2025-03-17 LAB
ALBUMIN SERPL ELPH-MCNC: 3.7 G/DL — SIGNIFICANT CHANGE UP (ref 3.4–5)
ALP SERPL-CCNC: 97 U/L — SIGNIFICANT CHANGE UP (ref 40–120)
ALT FLD-CCNC: 30 U/L — SIGNIFICANT CHANGE UP (ref 12–42)
ANION GAP SERPL CALC-SCNC: 8 MMOL/L — LOW (ref 9–16)
APTT BLD: 34.1 SEC — SIGNIFICANT CHANGE UP (ref 24.5–35.6)
AST SERPL-CCNC: 19 U/L — SIGNIFICANT CHANGE UP (ref 15–37)
BASOPHILS # BLD AUTO: 0.03 K/UL — SIGNIFICANT CHANGE UP (ref 0–0.2)
BASOPHILS NFR BLD AUTO: 0.7 % — SIGNIFICANT CHANGE UP (ref 0–2)
BILIRUB SERPL-MCNC: 0.8 MG/DL — SIGNIFICANT CHANGE UP (ref 0.2–1.2)
BUN SERPL-MCNC: 13 MG/DL — SIGNIFICANT CHANGE UP (ref 7–23)
CALCIUM SERPL-MCNC: 8.6 MG/DL — SIGNIFICANT CHANGE UP (ref 8.5–10.5)
CHLORIDE SERPL-SCNC: 105 MMOL/L — SIGNIFICANT CHANGE UP (ref 96–108)
CO2 SERPL-SCNC: 28 MMOL/L — SIGNIFICANT CHANGE UP (ref 22–31)
CREAT SERPL-MCNC: 0.92 MG/DL — SIGNIFICANT CHANGE UP (ref 0.5–1.3)
D DIMER BLD IA.RAPID-MCNC: <187 NG/ML DDU — SIGNIFICANT CHANGE UP
EGFR: 75 ML/MIN/1.73M2 — SIGNIFICANT CHANGE UP
EGFR: 75 ML/MIN/1.73M2 — SIGNIFICANT CHANGE UP
EOSINOPHIL # BLD AUTO: 0.27 K/UL — SIGNIFICANT CHANGE UP (ref 0–0.5)
EOSINOPHIL NFR BLD AUTO: 6.2 % — HIGH (ref 0–6)
FLUAV AG NPH QL: SIGNIFICANT CHANGE UP
FLUBV AG NPH QL: SIGNIFICANT CHANGE UP
GLUCOSE SERPL-MCNC: 93 MG/DL — SIGNIFICANT CHANGE UP (ref 70–99)
HCT VFR BLD CALC: 32.8 % — LOW (ref 34.5–45)
HGB BLD-MCNC: 11.2 G/DL — LOW (ref 11.5–15.5)
IMM GRANULOCYTES # BLD AUTO: 0 K/UL — SIGNIFICANT CHANGE UP (ref 0–0.07)
IMM GRANULOCYTES NFR BLD AUTO: 0 % — SIGNIFICANT CHANGE UP (ref 0–0.9)
INR BLD: 1.08 — SIGNIFICANT CHANGE UP (ref 0.85–1.16)
LIDOCAIN IGE QN: 24 U/L — SIGNIFICANT CHANGE UP (ref 16–77)
LYMPHOCYTES # BLD AUTO: 2.53 K/UL — SIGNIFICANT CHANGE UP (ref 1–3.3)
LYMPHOCYTES NFR BLD AUTO: 57.9 % — HIGH (ref 13–44)
MAGNESIUM SERPL-MCNC: 1.7 MG/DL — SIGNIFICANT CHANGE UP (ref 1.6–2.6)
MCHC RBC-ENTMCNC: 27.6 PG — SIGNIFICANT CHANGE UP (ref 27–34)
MCHC RBC-ENTMCNC: 34.1 G/DL — SIGNIFICANT CHANGE UP (ref 32–36)
MCV RBC AUTO: 80.8 FL — SIGNIFICANT CHANGE UP (ref 80–100)
MONOCYTES # BLD AUTO: 0.34 K/UL — SIGNIFICANT CHANGE UP (ref 0–0.9)
MONOCYTES NFR BLD AUTO: 7.8 % — SIGNIFICANT CHANGE UP (ref 2–14)
NEUTROPHILS # BLD AUTO: 1.2 K/UL — LOW (ref 1.8–7.4)
NEUTROPHILS NFR BLD AUTO: 27.4 % — LOW (ref 43–77)
NRBC # BLD AUTO: 0 K/UL — SIGNIFICANT CHANGE UP (ref 0–0)
NRBC # FLD: 0 K/UL — SIGNIFICANT CHANGE UP (ref 0–0)
NRBC BLD AUTO-RTO: 0 /100 WBCS — SIGNIFICANT CHANGE UP (ref 0–0)
NT-PROBNP SERPL-SCNC: 23 PG/ML — SIGNIFICANT CHANGE UP
PLATELET # BLD AUTO: 150 K/UL — SIGNIFICANT CHANGE UP (ref 150–400)
PMV BLD: 11.1 FL — SIGNIFICANT CHANGE UP (ref 7–13)
POTASSIUM SERPL-MCNC: 3.5 MMOL/L — SIGNIFICANT CHANGE UP (ref 3.5–5.3)
POTASSIUM SERPL-SCNC: 3.5 MMOL/L — SIGNIFICANT CHANGE UP (ref 3.5–5.3)
PROT SERPL-MCNC: 7.6 G/DL — SIGNIFICANT CHANGE UP (ref 6.4–8.2)
PROTHROM AB SERPL-ACNC: 12.5 SEC — SIGNIFICANT CHANGE UP (ref 9.9–13.4)
RBC # BLD: 4.06 M/UL — SIGNIFICANT CHANGE UP (ref 3.8–5.2)
RBC # FLD: 13.1 % — SIGNIFICANT CHANGE UP (ref 10.3–14.5)
RSV RNA NPH QL NAA+NON-PROBE: SIGNIFICANT CHANGE UP
SARS-COV-2 RNA SPEC QL NAA+PROBE: SIGNIFICANT CHANGE UP
SODIUM SERPL-SCNC: 141 MMOL/L — SIGNIFICANT CHANGE UP (ref 132–145)
TROPONIN I, HIGH SENSITIVITY RESULT: 5.8 NG/L — SIGNIFICANT CHANGE UP
WBC # BLD: 4.37 K/UL — SIGNIFICANT CHANGE UP (ref 3.8–10.5)
WBC # FLD AUTO: 4.37 K/UL — SIGNIFICANT CHANGE UP (ref 3.8–10.5)

## 2025-03-17 PROCEDURE — 99285 EMERGENCY DEPT VISIT HI MDM: CPT

## 2025-03-17 PROCEDURE — 71045 X-RAY EXAM CHEST 1 VIEW: CPT | Mod: 26

## 2025-03-17 RX ORDER — LIDOCAINE HYDROCHLORIDE 20 MG/ML
15 JELLY TOPICAL ONCE
Refills: 0 | Status: COMPLETED | OUTPATIENT
Start: 2025-03-17 | End: 2025-03-17

## 2025-03-17 RX ORDER — MAGNESIUM, ALUMINUM HYDROXIDE 200-200 MG
30 TABLET,CHEWABLE ORAL ONCE
Refills: 0 | Status: COMPLETED | OUTPATIENT
Start: 2025-03-17 | End: 2025-03-17

## 2025-03-17 RX ADMIN — Medication 1000 MILLILITER(S): at 21:10

## 2025-03-17 RX ADMIN — Medication 20 MILLIGRAM(S): at 20:28

## 2025-03-17 RX ADMIN — Medication 2000 MILLILITER(S): at 19:47

## 2025-03-17 RX ADMIN — Medication 30 MILLILITER(S): at 20:28

## 2025-03-17 NOTE — ED ADULT TRIAGE NOTE - CHIEF COMPLAINT QUOTE
Pt walked in c/o lightheadedness 10 minutes pta. Pt states "My vision went black and now my eyes are blurry." Pt also endorses chest pain since yesterday. Hx of anxiety. Stroke code called in triage and pt brought to CT.

## 2025-03-17 NOTE — ED PROVIDER NOTE - PROGRESS NOTE DETAILS
The patient is feeling better, she thinks that the GI cocktail helped.  Her labs, chest x-ray and EKG are reassuring.  I suspect that her symptoms are acid reflux related.  Will send a prescription for Protonix.  I advised her to take it for a week or 2.

## 2025-03-17 NOTE — ED ADULT NURSE NOTE - NURSING NEURO ORIENTATION
What Type Of Note Output Would You Prefer (Optional)?: Bullet Format Hpi Title: Evaluation of Skin Lesions How Severe Are Your Spot(S)?: mild Have Your Spot(S) Been Treated In The Past?: has not been treated oriented to person, place and time

## 2025-03-17 NOTE — ED ADULT NURSE NOTE - CHPI ED NUR RELIEVING FX

## 2025-03-17 NOTE — ED PROVIDER NOTE - PATIENT PORTAL LINK FT
You can access the FollowMyHealth Patient Portal offered by [facility] by registering at the following website: [email or web address]. By joining [employer]’s FollowMyHealth portal, you will also be able to view your health information using other applications (apps) compatible with our system.

## 2025-03-17 NOTE — ED PROVIDER NOTE - OBJECTIVE STATEMENT
49-year-old female presenting with midsternal and upper epigastric chest pain worsening since yesterday.  She initially put it off as she thought that it was likely acid reflux.  Shortly before coming into the ED the pain got worse, she leaned against a wall felt her vision go gray and felt lightheaded.  She went to rest and then called 911.  By the time she arrived to the ED her vision was slightly blurry in both eyes.  She was still having the chest discomfort.  She has no other significant past medical history and no other neurologic symptoms.  She has no shortness of breath and no recent URI.

## 2025-03-17 NOTE — ED PROVIDER NOTE - PHYSICAL EXAMINATION
VITAL SIGNS: I have reviewed nursing notes and confirm.   CONST: Well-developed; well-nourished; No apparent distress.  ENT: No nasal discharge; airway clear.  HEAD: Normocephalic; atraumatic.  EYES: Sclera clear. Pupils round and symmetrical bilaterally. VA= baseline (blurriness resolved)  CARD: S1, S2 normal; no murmurs, gallops, or rubs. Regular rate and rhythm.  RESP: No wheezes, rales or rhonchi. Breathing comfortably; speaking in full sentences.   ABD: Soft; non-distended; non-tender; no rebound or guarding.  : No CVA tenderness bilaterally.  MSK: No acute deformities noted to extremities. No tenderness to cervical/thoracic/lumbar spine.  NEURO: Alert, oriented. Speech is fluent and appropriate. Moving all extremities appropriately. No gross motor or sensory abnormalities.   SKIN: Skin is normal temperature; no diaphoresis; no pallor.   PSYCH: Cooperative. Appropriate mood, language, and behavior.

## 2025-03-17 NOTE — ED PROVIDER NOTE - CLINICAL SUMMARY MEDICAL DECISION MAKING FREE TEXT BOX
49-year-old female with a past medical history of migraines, acid reflux palpitations presenting with midsternal chest pain since yesterday associated with an episode of lightheadedness associated with her vision going black and gray just prior to presentation.    A possible stroke alert was activated from triage, I saw her immediately upon arrival and deactivated that alert.  Her symptoms are not consistent with stroke.      I more strongly suspect acid reflux with a subsequent vasovagal episode.  Will send screening labs including troponin and D-dimer to exclude ACS and PE though this seems unlikely.  Will also check a chest x-ray to evaluate for pneumonia or pneumothorax.  Will try symptomatic relief with IV Pepcid, p.o. Maalox and viscous lidocaine.  The patient's EKG was reassuring and was nonischemic.

## 2025-03-20 DIAGNOSIS — R07.89 OTHER CHEST PAIN: ICD-10-CM

## 2025-03-20 DIAGNOSIS — R00.1 BRADYCARDIA, UNSPECIFIED: ICD-10-CM

## 2025-07-26 ENCOUNTER — EMERGENCY (EMERGENCY)
Facility: HOSPITAL | Age: 50
LOS: 1 days | End: 2025-07-26
Attending: EMERGENCY MEDICINE | Admitting: EMERGENCY MEDICINE
Payer: COMMERCIAL

## 2025-07-26 VITALS
WEIGHT: 104.94 LBS | HEART RATE: 66 BPM | TEMPERATURE: 98 F | RESPIRATION RATE: 17 BRPM | SYSTOLIC BLOOD PRESSURE: 105 MMHG | DIASTOLIC BLOOD PRESSURE: 73 MMHG

## 2025-07-26 DIAGNOSIS — Z90.710 ACQUIRED ABSENCE OF BOTH CERVIX AND UTERUS: Chronic | ICD-10-CM

## 2025-07-26 PROCEDURE — 99284 EMERGENCY DEPT VISIT MOD MDM: CPT

## 2025-07-26 RX ORDER — IBUPROFEN 200 MG
600 TABLET ORAL ONCE
Refills: 0 | Status: COMPLETED | OUTPATIENT
Start: 2025-07-26 | End: 2025-07-26

## 2025-07-26 RX ORDER — METOCLOPRAMIDE HCL 10 MG
10 TABLET ORAL ONCE
Refills: 0 | Status: COMPLETED | OUTPATIENT
Start: 2025-07-26 | End: 2025-07-26

## 2025-07-26 RX ADMIN — Medication 600 MILLIGRAM(S): at 16:37

## 2025-07-26 RX ADMIN — Medication 10 MILLIGRAM(S): at 16:37

## 2025-07-26 NOTE — ED ADULT NURSE NOTE - NS ED NOTE ABUSE RESPONSE YN
Thank you for choosing [facility] Podiatry / Foot & Ankle Surgery!    DR KEN'S CLINIC:  [facility]   [address]   [place] [address]      TRIAGE LINE: [phone]  APPOINTMENTS: [phone]  RADIOLOGY: [phone]  SET UP SURGERY: [phone]  PHYSICAL THERAPY: [phone]   FAX NUMBER: [phone]  BILLING QUESTIONS: [phone]       Follow up: 1 week           Yes

## 2025-07-26 NOTE — ED PROVIDER NOTE - CLINICAL SUMMARY MEDICAL DECISION MAKING FREE TEXT BOX
The patient is a pleasant 50-year-old woman who was at work 2 days ago leaning over and when she stood up she hit the top of her head against the corner of a metal cabinet.  Patient states that she has had persistent headache since the incident 48 hours ago but the headache has been slowly improving.  The patient denies nausea vomiting dizziness lightheadedness or  motor or sensory visual disturbances    closed head injury 48 hours ago with concerning signs or symptoms The patient is a pleasant 50-year-old woman who was at work 2 days ago leaning over and when she stood up she hit the top of her head against the corner of a metal cabinet.  Patient states that she has had persistent headache since the incident 48 hours ago but the headache has been slowly improving.  The patient denies nausea vomiting dizziness lightheadedness or  motor or sensory visual disturbances    closed head injury 48 hours ago with concerning signs or symptoms    feeling better post meds     ED evaluation and management discussed with the patient and family (if available) in detail.  Close PMD follow up encouraged.  Strict ED return instructions discussed in detail and patient given the opportunity to ask any questions about their discharge diagnosis and instructions. Patient verbalized understanding.

## 2025-07-26 NOTE — ED PROVIDER NOTE - OBJECTIVE STATEMENT
The patient is a pleasant 50-year-old woman who was at work 2 days ago leaning over and when she stood up she hit the top of her head against the corner of a metal cabinet.    Patient states that she has had persistent headache since the incident 48 hours ago but the headache has been slowly improving.  The patient denies nausea vomiting dizziness lightheadedness or  motor or sensory visual disturbances    No LOC with the initial incident.

## 2025-07-26 NOTE — ED PROVIDER NOTE - NSFOLLOWUPINSTRUCTIONS_ED_ALL_ED_FT
1. stay well hydrated  2. take ibuprofen for pain as needed   3. follow up with your primary care doctor as scheduled   4. return to ER if your symptoms worsen or for any other concern

## 2025-07-26 NOTE — ED PROVIDER NOTE - PATIENT PORTAL LINK FT
You can access the FollowMyHealth Patient Portal offered by [facility] by registering at the following website: [email or web address]. By joining [email or web address]’s FollowMyHealth portal, you will also be able to view your health information using other applications (apps) compatible with our system.

## 2025-07-26 NOTE — ED ADULT TRIAGE NOTE - CHIEF COMPLAINT QUOTE
Pt presents to ED c/o headache for two days. States she hit her head against a locker door, denies LOC. No laceration noted.

## 2025-07-29 DIAGNOSIS — R51.9 HEADACHE, UNSPECIFIED: ICD-10-CM

## 2025-07-29 DIAGNOSIS — S09.90XA UNSPECIFIED INJURY OF HEAD, INITIAL ENCOUNTER: ICD-10-CM
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
## (undated) DEVICE — SUT PROLENE 0 30" CT-2

## (undated) DEVICE — ELCTR BOVIE TIP BLADE INSULATED 2.75" EDGE

## (undated) DEVICE — SUT MONOCRYL 4-0 27" PS-2 UNDYED

## (undated) DEVICE — DRSG DERMABOND 0.7ML

## (undated) DEVICE — ELCTR PENCIL SMOKE EVACUATOR COATED PUSH BUTTON 70MM

## (undated) DEVICE — SUT VICRYL 2-0 27" SH

## (undated) DEVICE — ELCTR GROUNDING PAD ADULT COVIDIEN

## (undated) DEVICE — GOWN ROYAL SILK XL

## (undated) DEVICE — SLV COMPRESSION KNEE MED

## (undated) DEVICE — DRAPE IOBAN 23" X 23"

## (undated) DEVICE — POSITIONER FOAM EGG CRATE ULNAR 2PCS (PINK)

## (undated) DEVICE — GLV 7 PROTEXIS (WHITE)

## (undated) DEVICE — DRSG STERISTRIPS 0.5 X 4"

## (undated) DEVICE — ELCTR STRYKER NEPTUNE BLADE COATED, INSULATED 70MM

## (undated) DEVICE — DRSG SUPPORTER ADULT 3" WAISTBAND MED

## (undated) DEVICE — SUPP ATHLETIC MALE XLG 44-55IN

## (undated) DEVICE — Device

## (undated) DEVICE — SUT MAXON 0 30" GS-11

## (undated) DEVICE — WARMING BLANKET LOWER ADULT

## (undated) DEVICE — PREP CHLORAPREP HI-LITE ORANGE 26ML

## (undated) DEVICE — DRSG SUPPORTER ADULT 3" WAISTBAND LRG